# Patient Record
Sex: FEMALE | Race: WHITE | NOT HISPANIC OR LATINO | Employment: UNEMPLOYED | URBAN - NONMETROPOLITAN AREA
[De-identification: names, ages, dates, MRNs, and addresses within clinical notes are randomized per-mention and may not be internally consistent; named-entity substitution may affect disease eponyms.]

---

## 2022-02-04 ENCOUNTER — OFFICE VISIT (OUTPATIENT)
Dept: URGENT CARE | Facility: CLINIC | Age: 37
End: 2022-02-04
Payer: COMMERCIAL

## 2022-02-04 VITALS
WEIGHT: 182 LBS | OXYGEN SATURATION: 98 % | SYSTOLIC BLOOD PRESSURE: 111 MMHG | BODY MASS INDEX: 30.32 KG/M2 | DIASTOLIC BLOOD PRESSURE: 75 MMHG | TEMPERATURE: 98.1 F | HEIGHT: 65 IN | HEART RATE: 102 BPM

## 2022-02-04 DIAGNOSIS — L50.9 URTICARIA: Primary | ICD-10-CM

## 2022-02-04 PROCEDURE — 99202 OFFICE O/P NEW SF 15 MIN: CPT | Performed by: PHYSICIAN ASSISTANT

## 2022-02-04 RX ORDER — LAMOTRIGINE 100 MG/1
100 TABLET ORAL
COMMUNITY

## 2022-02-04 RX ORDER — LISINOPRIL 10 MG/1
10 TABLET ORAL DAILY
COMMUNITY
Start: 2021-09-24 | End: 2022-07-20

## 2022-02-04 RX ORDER — METFORMIN HYDROCHLORIDE 500 MG/1
2000 TABLET, EXTENDED RELEASE ORAL DAILY
COMMUNITY
Start: 2021-12-08 | End: 2022-07-20

## 2022-02-04 RX ORDER — HYDROXYZINE 50 MG/1
50 TABLET, FILM COATED ORAL 3 TIMES DAILY PRN
COMMUNITY
Start: 2021-11-23

## 2022-02-04 RX ORDER — METHYLPREDNISOLONE 4 MG/1
TABLET ORAL
Qty: 1 EACH | Refills: 0 | Status: SHIPPED | OUTPATIENT
Start: 2022-02-04

## 2022-02-04 RX ORDER — BUPROPION HYDROCHLORIDE 150 MG/1
150 TABLET ORAL DAILY
COMMUNITY
Start: 2021-11-23

## 2022-02-04 RX ORDER — BUPROPION HYDROCHLORIDE 300 MG/1
450 TABLET ORAL
COMMUNITY

## 2022-02-04 RX ORDER — DULAGLUTIDE 4.5 MG/.5ML
4.5 INJECTION, SOLUTION SUBCUTANEOUS WEEKLY
COMMUNITY
Start: 2021-12-08 | End: 2022-07-20 | Stop reason: ALTCHOICE

## 2022-02-04 RX ORDER — LEVOTHYROXINE SODIUM 0.05 MG/1
50 TABLET ORAL DAILY
COMMUNITY
Start: 2021-12-08 | End: 2022-12-08

## 2022-02-04 RX ORDER — FAMOTIDINE 20 MG/1
20 TABLET, FILM COATED ORAL 2 TIMES DAILY
Qty: 15 TABLET | Refills: 0 | Status: SHIPPED | OUTPATIENT
Start: 2022-02-04

## 2022-02-04 RX ORDER — AMMONIUM LACTATE 12 G/100G
CREAM TOPICAL
COMMUNITY
Start: 2021-09-13 | End: 2022-09-13

## 2022-02-04 RX ORDER — ATORVASTATIN CALCIUM 20 MG/1
20 TABLET, FILM COATED ORAL DAILY
COMMUNITY
Start: 2021-09-24 | End: 2022-09-24

## 2022-02-04 NOTE — PATIENT INSTRUCTIONS
Start Pepcid and take Atarax 3 times daily  If no improvement start Medrol dose pack  Urticaria   AMBULATORY CARE:   Urticaria  is also called hives  Hives can change size and shape, and appear anywhere on your skin  They can be mild or severe and last from a few minutes to a few days  Hives may be a sign of a severe allergic reaction called anaphylaxis that needs immediate treatment  Urticaria that lasts longer than 6 weeks may be a chronic condition that needs long-term treatment  Call your local emergency number (911 in the [address] [facility]) for signs or symptoms of anaphylaxis,  such as trouble breathing, swelling in your mouth or throat, or wheezing  You may also have itching, a rash, or feel like you are going to faint  Seek care immediately if:   · Your heart is beating faster than it normally does  · You have cramping or severe pain in your abdomen  Call your doctor if:   · You have a fever  · Your skin still itches 24 hours after you take your medicine  · You still have hives after 7 days  · Your joints are painful and swollen  · You have questions or concerns about your condition or care  Steps to take for signs or symptoms of anaphylaxis:   · Immediately  give 1 shot of epinephrine only into the outer thigh muscle  · Leave the shot in place  as directed  Your healthcare provider may recommend you leave it in place for up to 10 seconds before you remove it  This helps make sure all of the epinephrine is delivered  · Call 911 and go to the emergency department,  even if the shot improved symptoms  Do not drive yourself  Bring the used epinephrine shot with you  Treatment for mild urticaria  may not be needed  Chronic urticaria may need to be treated with more than one medicine, or other medicines than listed below  The following are common medicines used to treat urticaria:  · Antihistamines  decrease mild symptoms such as itching or a rash      · Steroids  decrease redness, pain, and swelling  · Epinephrine  is used to treat severe allergic reactions such as anaphylaxis  Safety precautions to take if you are at risk for anaphylaxis:   · Keep 2 shots of epinephrine with you at all times  You may need a second shot, because epinephrine only works for about 20 minutes and symptoms may return  Your healthcare provider can show you and family members how to give the shot  Check the expiration date every month and replace it before it expires  · Create an action plan  Your healthcare provider can help you create a written plan that explains the allergy and an emergency plan to treat a reaction  The plan explains when to give a second epinephrine shot if symptoms return or do not improve after the first  Give copies of the action plan and emergency instructions to family members, work and school staff, and  providers  Show them how to give a shot of epinephrine  · Be careful when you exercise  If you have had exercise-induced anaphylaxis, do not exercise right after you eat  Stop exercising right away if you start to develop any signs or symptoms of anaphylaxis  You may first feel tired, warm, or have itchy skin  Hives, swelling, and severe breathing problems may develop if you continue to exercise  · Carry medical alert identification  Wear medical alert jewelry or carry a card that explains the allergy  Ask your healthcare provider where to get these items  · Keep a record of triggers and symptoms  Record everything you eat, drink, or apply to your skin for 3 weeks  Include stressful events and what you were doing right before your hives started  Bring the record with you to follow-up visits with your healthcare provider  Manage urticaria:   · Cool your skin  This may help decrease itching  Apply a cool pack to your hives  Dip a hand towel in cool water, wring it out, and place it on your hives   You may also soak your skin in a cool oatmeal bath     · Do not rub your hives  This can irritate your skin and cause more hives  · Wear loose clothing  Tight clothes may irritate your skin and cause more hives  · Manage stress  Stress may trigger hives, or make them worse  Learn new ways to relax, such as deep breathing  Follow up with your doctor as directed:  Write down your questions so you remember to ask them during your visits  © Copyright [employer] 2021 Information is for End User's use only and may not be sold, redistributed or otherwise used for commercial purposes  All illustrations and images included in CareNotes® are the copyrighted property of A D A M , Inc  or [facility] Hernesto Javed   The above information is an  only  It is not intended as medical advice for individual conditions or treatments  Talk to your doctor, nurse or pharmacist before following any medical regimen to see if it is safe and effective for you

## 2022-02-04 NOTE — PROGRESS NOTES
[facility]        NAME: Daniel Hannon is a 39 y o  female  : 1985    MRN: [id number]  DATE: 2022  TIME: 2:18 PM    Assessment and Plan   Urticaria [L50 9]  1  Urticaria  methylPREDNISolone 4 MG tablet therapy pack    famotidine (PEPCID) 20 mg tablet         Patient Instructions     Start Pepcid and take Atarax 3 times daily  If no improvement start Medrol dose pack  Follow up with PCP in 3-5 days  Proceed to  ER if symptoms worsen  Chief Complaint     Chief Complaint   Patient presents with    Urticaria     bilateral hands knees elbows         History of Present Illness       Patient presents with hives on upper and lower extremities which started this morning  No changes in lotions, soaps or detergents  Review of Systems   Review of Systems   Constitutional: Negative for chills and fever  Respiratory: Negative for shortness of breath  Skin: Positive for rash           Current Medications       Current Outpatient Medications:     ammonium lactate (LAC-HYDRIN) 12 % cream, Apply topically, Disp: , Rfl:     atorvastatin (LIPITOR) 20 mg tablet, Take 20 mg by mouth daily, Disp: , Rfl:     buPROPion (WELLBUTRIN XL) 150 mg 24 hr tablet, Take 150 mg by mouth daily, Disp: , Rfl:     Dulaglutide (Trulicity) 4 5 FJ/2 0NJ SOPN, Inject 4 5 mg under the skin Once a week, Disp: , Rfl:     hydrOXYzine HCL (ATARAX) 50 mg tablet, Take 50 mg by mouth Three times daily as needed, Disp: , Rfl:     levothyroxine 50 mcg tablet, Take 50 mcg by mouth daily, Disp: , Rfl:     lisinopril (ZESTRIL) 10 mg tablet, Take 10 mg by mouth daily, Disp: , Rfl:     metFORMIN (GLUCOPHAGE-XR) 500 mg 24 hr tablet, Take 2,000 mg by mouth daily, Disp: , Rfl:     buPROPion (WELLBUTRIN XL) 300 mg 24 hr tablet, Take 450 mg by mouth, Disp: , Rfl:     famotidine (PEPCID) 20 mg tablet, Take 1 tablet (20 mg total) by mouth 2 (two) times a day, Disp: 15 tablet, Rfl: 0    lamoTRIgine (LaMICtal) 100 mg tablet, Take 100 mg by mouth, Disp: , Rfl:     methylPREDNISolone 4 MG tablet therapy pack, Use as directed on package, Disp: 1 each, Rfl: 0    Current Allergies     Allergies as of 2022 - Reviewed 2022   Allergen Reaction Noted    Sulfacetamide Rash 2022    Ibuprofen Nausea Only 2022            The following portions of the patient's history were reviewed and updated as appropriate: allergies, current medications, past family history, past medical history, past social history, past surgical history and problem list      Past Medical History:   Diagnosis Date    Anxiety     [facility]ipolar 1 disorder ([facility] )     Depression     Diabetes mellitus ([facility] )     Hypertension     Ovarian cancer ([place] [address] )        Past Surgical History:   Procedure Laterality Date    BREAST RECONSTRUCTION  2022     SECTION      CHOLECYSTECTOMY      HYSTERECTOMY         Family History   Problem Relation Age of Onset    Cancer Mother     Mental illness Mother     Hyperlipidemia Father          Medications have been verified  Objective   /75   Pulse 102   Temp 98 1 °F (36 7 °C)   Ht 5' 5" (1 651 m)   Wt 82 6 kg (182 lb)   SpO2 98%   BMI 30 29 kg/m²   No LMP recorded  Physical Exam     Physical Exam  Vitals and nursing note reviewed  Constitutional:       Appearance: Normal appearance  HENT:      Head: Normocephalic and atraumatic  Cardiovascular:      Rate and Rhythm: Normal rate and regular rhythm  Pulmonary:      Effort: Pulmonary effort is normal    Skin:     General: Skin is warm  Comments: Raised blotchy rash of B/L elbows, knees and forearms consistent with urticaria  Neurological:      Mental Status: She is alert

## 2022-07-20 ENCOUNTER — OFFICE VISIT (OUTPATIENT)
Dept: URGENT CARE | Facility: CLINIC | Age: 37
End: 2022-07-20
Payer: COMMERCIAL

## 2022-07-20 VITALS
HEART RATE: 95 BPM | HEIGHT: 65 IN | DIASTOLIC BLOOD PRESSURE: 84 MMHG | OXYGEN SATURATION: 96 % | RESPIRATION RATE: 18 BRPM | WEIGHT: 197 LBS | TEMPERATURE: 98.3 F | SYSTOLIC BLOOD PRESSURE: 123 MMHG | BODY MASS INDEX: 32.82 KG/M2

## 2022-07-20 DIAGNOSIS — T23.211A: Primary | ICD-10-CM

## 2022-07-20 PROCEDURE — 90715 TDAP VACCINE 7 YRS/> IM: CPT

## 2022-07-20 PROCEDURE — 16020 DRESS/DEBRID P-THICK BURN S: CPT

## 2022-07-20 PROCEDURE — 99213 OFFICE O/P EST LOW 20 MIN: CPT

## 2022-07-20 RX ORDER — ESTROGENS, CONJUGATED 1.25 MG
1.25 TABLET ORAL DAILY
COMMUNITY
Start: 2022-07-03

## 2022-07-20 RX ORDER — DAPAGLIFLOZIN 10 MG/1
10 TABLET, FILM COATED ORAL DAILY
COMMUNITY
Start: 2022-05-23

## 2022-07-20 RX ORDER — EPINEPHRINE 0.3 MG/.3ML
INJECTION SUBCUTANEOUS
COMMUNITY
Start: 2022-02-07

## 2022-07-20 RX ORDER — FLUOXETINE HYDROCHLORIDE 20 MG/1
CAPSULE ORAL
COMMUNITY
Start: 2022-07-16

## 2022-07-20 RX ORDER — LIRAGLUTIDE 6 MG/ML
INJECTION SUBCUTANEOUS
COMMUNITY
Start: 2022-07-20

## 2022-07-20 RX ORDER — BUSPIRONE HYDROCHLORIDE 30 MG/1
30 TABLET ORAL 2 TIMES DAILY
COMMUNITY
Start: 2022-07-03

## 2022-07-20 RX ORDER — GINSENG 100 MG
1 CAPSULE ORAL ONCE
Status: COMPLETED | OUTPATIENT
Start: 2022-07-20 | End: 2022-07-20

## 2022-07-20 RX ADMIN — Medication 1 SMALL APPLICATION: at 15:26

## 2022-07-20 NOTE — PATIENT INSTRUCTIONS
Keep wound clean and dry  Wash with mild temperature water and soap  Apply bacitracin to the area  Avoiding opening the wound

## 2022-07-20 NOTE — PROGRESS NOTES
[facility]        NAME: Beto Rubio is a 39 y o  female  : 1985    MRN: [id number]  DATE: 2022  TIME: 3:51 PM    Assessment and Plan   Second degree burn of right thumb, initial encounter [T23 211A]  1  Second degree burn of right thumb, initial encounter  bacitracin topical ointment 1 small application    Tdap Vaccine greater than or equal to 6yo    Burn Treatment     Burn was cleansed in office  Given patient allergy to SSD did apply bacitracin to the area  Wound was dressed with sterile gauze  Tetanus shot was updated  Patient Instructions   Keep wound clean and dry  Wash with mild temperature water and soap  Apply bacitracin to the area  Avoiding opening the wound  Follow up with PCP in 3-5 days  Proceed to  ER if symptoms worsen  Chief Complaint     Chief Complaint   Patient presents with    Burn         History of Present Illness       Patient is a 39year old female who presents to the office today for a burn to her right thumb  States she was pulling french fries out of the oven and got burnt from the pan  Review of Systems   Review of Systems   Skin: Positive for wound  All other systems reviewed and are negative          Current Medications       Current Outpatient Medications:     ammonium lactate (LAC-HYDRIN) 12 % cream, Apply topically, Disp: , Rfl:     atorvastatin (LIPITOR) 20 mg tablet, Take 20 mg by mouth daily, Disp: , Rfl:     buPROPion (WELLBUTRIN XL) 150 mg 24 hr tablet, Take 150 mg by mouth daily, Disp: , Rfl:     buPROPion (WELLBUTRIN XL) 300 mg 24 hr tablet, Take 450 mg by mouth, Disp: , Rfl:     EPINEPHrine (EPIPEN) 0 3 mg/0 3 mL SOAJ, INJECT 0 3 MLS (0 3 MG TOTAL) INTO THE MUSCLE ONE TIME FOR 1 DOSE, Disp: , Rfl:     Farxiga 10 MG TABS, Take 10 mg by mouth daily, Disp: , Rfl:     hydrOXYzine HCL (ATARAX) 50 mg tablet, Take 50 mg by mouth Three times daily as needed, Disp: , Rfl:     lamoTRIgine (LaMICtal) 100 mg tablet, Take 100 mg by mouth, Disp: , Rfl:     levothyroxine 50 mcg tablet, Take 50 mcg by mouth daily, Disp: , Rfl:     metFORMIN (GLUCOPHAGE-XR) 500 mg 24 hr tablet, Take 2,000 mg by mouth daily, Disp: , Rfl:     Victoza injection, , Disp: , Rfl:     Ascorbic Acid, Vitamin C, (VITAMIN C) 100 MG tablet, Take by mouth, Disp: , Rfl:     busPIRone (BUSPAR) 30 MG tablet, Take 30 mg by mouth 2 (two) times a day, Disp: , Rfl:     famotidine (PEPCID) 20 mg tablet, Take 1 tablet (20 mg total) by mouth 2 (two) times a day, Disp: 15 tablet, Rfl: 0    FLUoxetine (PROzac) 20 mg capsule, , Disp: , Rfl:     methylPREDNISolone 4 MG tablet therapy pack, Use as directed on package, Disp: 1 each, Rfl: 0    Premarin 1 25 MG tablet, Take 1 25 mg by mouth daily, Disp: , Rfl:   No current facility-administered medications for this visit  Current Allergies     Allergies as of 2022 - Reviewed 2022   Allergen Reaction Noted    Lisinopril Angioedema 2022    Sulfacetamide Rash 2022    Ibuprofen Nausea Only 2022            The following portions of the patient's history were reviewed and updated as appropriate: allergies, current medications, past family history, past medical history, past social history, past surgical history and problem list      Past Medical History:   Diagnosis Date    Anxiety     [facility]     Depression     [facility] mellitus [facility] )     Hypertension     [facility]ian cancer ([facility] )        Past Surgical History:   Procedure Laterality Date    BREAST RECONSTRUCTION  2022     SECTION      CHOLECYSTECTOMY      HYSTERECTOMY         Family History   Problem Relation Age of Onset    Cancer Mother     Mental illness Mother     Hyperlipidemia Father          Medications have been verified          Objective   /84   Pulse 95   Temp 98 3 °F (36 8 °C)   Resp 18   Ht 5' 5" (1 651 m)   Wt 89 4 kg (197 lb)   SpO2 96%   BMI 32 78 kg/m²          Physical Exam Physical Exam  Vitals and nursing note reviewed  Constitutional:       General: She is not in acute distress  Appearance: Normal appearance  She is normal weight  She is not ill-appearing or toxic-appearing  Cardiovascular:      Rate and Rhythm: Normal rate and regular rhythm  Pulses: Normal pulses  Heart sounds: Normal heart sounds  No murmur heard  No friction rub  No gallop  Pulmonary:      Effort: Pulmonary effort is normal       Breath sounds: Normal breath sounds  Musculoskeletal:        Hands:       Comments: Patient has full range of motion of the thumb and fingers  Sensation intact  Cap refill less than 2 seconds  Skin:     Capillary Refill: Capillary refill takes less than 2 seconds  Findings: Burn present  Neurological:      General: No focal deficit present  Mental Status: She is alert and oriented to person, place, and time  Universal Protocol:  Consent: Verbal consent obtained  Risks and benefits: risks, benefits and alternatives were discussed  Consent given by: patient  Time out: Immediately prior to procedure a "time out" was called to verify the correct patient, procedure, equipment, support staff and site/side marked as required  Burn Treatment    Date/Time: 7/20/2022 3:49 PM  Performed by: [employer]   Authorized by: [employer] [place]     Patient location:  Clinic  Burn area 1 details:     Burn depth:  Partial thickness (2nd)    Burn extent (%):  1    Affected area:  Upper extremity    Upper extremity location:  R hand    Burn notes:  Second degree burn to right thumb    Debridement performed: no      Wound treatment:  Bacitracin and other (comment) (antimicrobial soap and tepid water)    Dressing:  Non-stick sterile dressing  Post-procedure details:     Patient tolerance of procedure:   Tolerated well, no immediate complications

## 2022-09-16 ENCOUNTER — DOCTOR'S OFFICE (OUTPATIENT)
Dept: URBAN - NONMETROPOLITAN AREA CLINIC 1 | Facility: CLINIC | Age: 37
Setting detail: OPHTHALMOLOGY
End: 2022-09-16
Payer: COMMERCIAL

## 2022-09-16 DIAGNOSIS — H43.393: ICD-10-CM

## 2022-09-16 DIAGNOSIS — E11.9: ICD-10-CM

## 2022-09-16 DIAGNOSIS — H02.402: ICD-10-CM

## 2022-09-16 PROCEDURE — 92201 OPSCPY EXTND RTA DRAW UNI/BI: CPT | Performed by: OPHTHALMOLOGY

## 2022-09-16 PROCEDURE — 92134 CPTRZ OPH DX IMG PST SGM RTA: CPT | Performed by: OPHTHALMOLOGY

## 2022-09-16 PROCEDURE — 99214 OFFICE O/P EST MOD 30 MIN: CPT | Performed by: OPHTHALMOLOGY

## 2022-09-16 ASSESSMENT — VISUAL ACUITY
OS_BCVA: 20/30
OD_BCVA: 20/50-2

## 2022-09-16 ASSESSMENT — LID POSITION - PTOSIS
OD_PTOSIS: 1+
OS_PTOSIS: LUL

## 2022-09-16 ASSESSMENT — CONFRONTATIONAL VISUAL FIELD TEST (CVF)
OD_FINDINGS: FULL
OS_FINDINGS: FULL

## 2022-10-12 ENCOUNTER — DOCTOR'S OFFICE (OUTPATIENT)
Dept: URBAN - NONMETROPOLITAN AREA CLINIC 1 | Facility: CLINIC | Age: 37
Setting detail: OPHTHALMOLOGY
End: 2022-10-12
Payer: COMMERCIAL

## 2022-10-12 ENCOUNTER — EVALUATION (OUTPATIENT)
Dept: PHYSICAL THERAPY | Facility: CLINIC | Age: 37
End: 2022-10-12
Payer: COMMERCIAL

## 2022-10-12 DIAGNOSIS — H53.40: ICD-10-CM

## 2022-10-12 DIAGNOSIS — M70.61 TROCHANTERIC BURSITIS OF RIGHT HIP: Primary | ICD-10-CM

## 2022-10-12 DIAGNOSIS — H50.52: ICD-10-CM

## 2022-10-12 DIAGNOSIS — H53.2: ICD-10-CM

## 2022-10-12 DIAGNOSIS — Q07.8: ICD-10-CM

## 2022-10-12 PROCEDURE — 97535 SELF CARE MNGMENT TRAINING: CPT

## 2022-10-12 PROCEDURE — 97162 PT EVAL MOD COMPLEX 30 MIN: CPT

## 2022-10-12 PROCEDURE — 92060 SENSORIMOTOR EXAMINATION: CPT | Performed by: OPHTHALMOLOGY

## 2022-10-12 PROCEDURE — 99213 OFFICE O/P EST LOW 20 MIN: CPT | Performed by: OPHTHALMOLOGY

## 2022-10-12 PROCEDURE — 92083 EXTENDED VISUAL FIELD XM: CPT | Performed by: OPHTHALMOLOGY

## 2022-10-12 ASSESSMENT — REFRACTION_CURRENTRX
OS_SPHERE: -2.75
OD_OVR_VA: 20/
OD_VPRISM_DIRECTION: SV
OD_CYLINDER: -0.75
OS_OVR_VA: 20/
OS_AXIS: 137
OD_AXIS: 11
OD_SPHERE: -1.00
OS_CYLINDER: -1.00
OS_VPRISM_DIRECTION: SV

## 2022-10-12 ASSESSMENT — VISUAL ACUITY
OS_BCVA: 20/25-2
OD_BCVA: 20/50-2

## 2022-10-12 ASSESSMENT — CONFRONTATIONAL VISUAL FIELD TEST (CVF)
OS_FINDINGS: FULL
OD_FINDINGS: FULL

## 2022-10-12 ASSESSMENT — LID POSITION - PTOSIS: OS_PTOSIS: LUL

## 2022-10-12 NOTE — PROGRESS NOTES
PT Evaluation     Today's date: 10/12/2022  Patient name: Manjit Maldonado  : 1985  MRN: [id number]  Referring provider: Zackary Painting MD  Dx:   Encounter Diagnosis     ICD-10-CM    1  Trochanteric bursitis of right hip  M70 61        Start Time: 0900  Stop Time: 09  Total time in clinic (min): 47 minutes    Assessment  Assessment details: Patient is a 44 y o female who presents to OP PT with R hip pain and signs and symptoms consistent with hip bursitis  Upon examination patient presents with key impairments limited ROM, LE strength, muscular endurance, and dynamic balance  Due to this patient is restricted with negotiating stairs, standing/walking for long periods of time, and sleeping on right side  Patient is limited in participating in ADLs/IADLs, work and caring for self/children  Patient will benefit from skilled PT and POC will include stretching, strengthening, analgesic modalities, and manual therapy in order to maximize functional independence  Impairments: abnormal gait, abnormal muscle tone, abnormal or restricted ROM, activity intolerance, impaired physical strength, lacks appropriate home exercise program and pain with function  Understanding of Dx/Px/POC: good   Prognosis: good    Goals  STG: (in 4 weeks)  Patient will initiate HEP  Patient will improve hip ROM in all planes  Patient will decrease pain from 9/10 to 4/10 in R hip  Patient will improve sitting/standing tolerance >15 min in order to be more independent with ADLs  Patient will improve sleeping tolerance to be able to sleep throughout the night in order to improve QOL  LTG: (in 8 weeks)  Patient will decrease pain 9/10 to 1/10 in R hip  Patient will improve strength by 1-2 MMT in R hip musculature  Patient will improve FOTO score from initial evaluation to discharge score  Patient will improve walking tolerance >30 min       Plan  Patient would benefit from: skilled physical therapy  Planned modality interventions: cryotherapy, thermotherapy: hydrocollator packs and TENS  Planned therapy interventions: balance, flexibility, home exercise program, functional ROM exercises, joint mobilization, manual therapy, patient education, self care, strengthening, stretching, therapeutic exercise and therapeutic activities  Frequency: 2x week  Duration in weeks: 8  Treatment plan discussed with: patient        Subjective Evaluation    History of Present Illness  Date of onset: 2022  Mechanism of injury: Patient indicated that her right hip has been causing her pain for > 6 months  This pain is located on posterior-lateral portion of R Hip that radiates into R groin and low back  She was administered steroid injection and anti-inflammatory cream with no relief  Patient experiences difficulty with steps, standing/walking for long periods of time, and sleeping on R hip  Quality of life: good    Pain  Current pain ratin  At best pain ratin  At worst pain ratin  Location: R posterior-lateral, groin and low back  Quality: dull ache and radiating  Relieving factors: heat  Aggravating factors: running, walking, sitting and standing    Social Support  Stairs in house: yes (14 steps)   Lives in: multiple-level home    Working: work from home free [employer]ilo  Treatments  Previous treatment: injection treatment (anti-inflammatory cream)  Current treatment: physical therapy  Patient Goals  Patient goals for therapy: decreased pain, increased motion, increased strength and independence with ADLs/IADLs  Patient goal: Patient would like to get back to daily walking, taking after kids, and ADLS  Objective     Tenderness     Left Hip   No tenderness in the PSIS and greater trochanter  Right Hip   Tenderness in the PSIS and greater trochanter  Additional Tenderness Details  TTP on R greater trochanter       Active Range of Motion   Left Hip   Flexion: WFL    Right Hip   Flexion: 95 degrees with pain  Extension: 10 degrees   Abduction: 30 degrees   External rotation (90/90): 35 degrees with pain  Internal rotation (90/90): 25 degrees with pain  Left Knee   Normal active range of motion    Right Knee   Normal active range of motion  Left Ankle/Foot   Normal active range of motion    Right Ankle/Foot   Normal active range of motion    Strength/Myotome Testing     Left Hip   Planes of Motion   Flexion: 4+  Extension: 4+  Abduction: 4+  Adduction: 4+  External rotation: 4  Internal rotation: 4    Right Hip   Planes of Motion   Flexion: 3+ (Pain in right posterior-lateral and groin)  Extension: 4  Abduction: 3+ (Pain in right posterior-lateral and groin)  External rotation: 3+ (Pain in right posterior-lateral and groin)  Internal rotation: 3+ (Pain in right posterior-lateral and groin)    Left Knee   Flexion: 4+  Extension: 4+    Right Knee   Flexion: 4  Prone flexion: 4    Left Ankle/Foot   Dorsiflexion: 4+  Plantar flexion: 4+  Inversion: 4+  Eversion: 4+    Right Ankle/Foot   Dorsiflexion: 4  Plantar flexion: 3+  Inversion: 4  Eversion: 4    Tests     Left Hip   Negative NINA, FADIR, Mandi, scour, SI compression and SI distraction  Right Hip   Positive NINA and Mandi  Negative FADIR, scour, SI compression and SI distraction                Precautions:       Manuals 10/12            LE stretching              STM                                        Neuro Re-Ed             SLS             Tandem on foam                                                                              Ther Ex             Nu-step             TR/HR             3-way hip SLR             1/4 squat             Step-ups fwd/lat             Standing ITB wall stretch             QS             LAQ             Bridges             Claimshells              Adduction Ball squeeze                                                     Ther Activity                                       Gait Training Modalities             CP             TENS

## 2022-10-12 NOTE — LETTER
2022    Magaly Stern MD  [address] [address]    Patient: Lena Rivero   YOB: 1985   Date of Visit: 10/12/2022     Encounter Diagnosis     ICD-10-CM    1  Trochanteric bursitis of right hip  M70 61        Dear Dr Kristy Friedman: Thank you for your recent referral of Lena Rivero  Please review the attached evaluation summary from [address] recent visit  Please verify that you agree with the plan of care by signing the attached order  If you have any questions or concerns, please do not hesitate to call  I sincerely appreciate the opportunity to share in the care of one of your patients and hope to have another opportunity to work with you in the near future  Sincerely,    Mak Menendez, PT      Referring Provider:      I certify that I have read the below Plan of Care and certify the need for these services furnished under this plan of treatment while under my care  Magaly Stern MD  [address]  Via Fax: [phone]          PT Evaluation     Today's date: 10/12/2022  Patient name: Lena Rivero  : 1985  MRN: [id number]  Referring provider: Reginald Sacks, MD  Dx:   Encounter Diagnosis     ICD-10-CM    1  Trochanteric bursitis of right hip  M70 61        Start Time: 0900  Stop Time: 09  Total time in clinic (min): 47 minutes    Assessment  Assessment details: Patient is a 44 y o female who presents to OP PT with R hip pain and signs and symptoms consistent with hip bursitis  Upon examination patient presents with key impairments limited ROM, LE strength, muscular endurance, and dynamic balance  Due to this patient is restricted with negotiating stairs, standing/walking for long periods of time, and sleeping on right side  Patient is limited in participating in ADLs/IADLs, work and caring for self/children   Patient will benefit from skilled PT and POC will include stretching, strengthening, analgesic modalities, and manual therapy in order to maximize functional independence  Impairments: abnormal gait, abnormal muscle tone, abnormal or restricted ROM, activity intolerance, impaired physical strength, lacks appropriate home exercise program and pain with function  Understanding of Dx/Px/POC: good   Prognosis: good    Goals  STG: (in 4 weeks)  Patient will initiate HEP  Patient will improve hip ROM in all planes  Patient will decrease pain from 9/10 to 4/10 in R hip  Patient will improve sitting/standing tolerance >15 min in order to be more independent with ADLs  Patient will improve sleeping tolerance to be able to sleep throughout the night in order to improve QOL  LTG: (in 8 weeks)  Patient will decrease pain 9/10 to 1/10 in R hip  Patient will improve strength by 1-2 MMT in R hip musculature  Patient will improve FOTO score from initial evaluation to discharge score  Patient will improve walking tolerance >30 min  Plan  Patient would benefit from: skilled physical therapy  Planned modality interventions: cryotherapy, thermotherapy: hydrocollator packs and TENS  Planned therapy interventions: balance, flexibility, home exercise program, functional ROM exercises, joint mobilization, manual therapy, patient education, self care, strengthening, stretching, therapeutic exercise and therapeutic activities  Frequency: 2x week  Duration in weeks: 8  Treatment plan discussed with: patient        Subjective Evaluation    History of Present Illness  Date of onset: 4/12/2022  Mechanism of injury: Patient indicated that her right hip has been causing her pain for > 6 months  This pain is located on posterior-lateral portion of R Hip that radiates into R groin and low back  She was administered steroid injection and anti-inflammatory cream with no relief  Patient experiences difficulty with steps, standing/walking for long periods of time, and sleeping on R hip  Quality of life: good    Pain  Current pain ratin  At best pain ratin  At worst pain ratin  Location: R posterior-lateral, groin and low back  Quality: dull ache and radiating  Relieving factors: heat  Aggravating factors: running, walking, sitting and standing    Social Support  Stairs in house: yes (14 steps)   Lives in: multiple-level home    Working: work from home free [employer]  Treatments  Previous treatment: injection treatment (anti-inflammatory cream)  Current treatment: physical therapy  Patient Goals  Patient goals for therapy: decreased pain, increased motion, increased strength and independence with ADLs/IADLs  Patient goal: Patient would like to get back to daily walking, taking after kids, and ADLS  Objective     Tenderness     Left Hip   No tenderness in the PSIS and greater trochanter  Right Hip   Tenderness in the PSIS and greater trochanter  Additional Tenderness Details  TTP on R greater trochanter       Active Range of Motion   Left Hip   Flexion: WFL    Right Hip   Flexion: 95 degrees with pain  Extension: 10 degrees   Abduction: 30 degrees   External rotation (90/90): 35 degrees with pain  Internal rotation (90/90): 25 degrees with pain  Left Knee   Normal active range of motion    Right Knee   Normal active range of motion  Left Ankle/Foot   Normal active range of motion    Right Ankle/Foot   Normal active range of motion    Strength/Myotome Testing     Left Hip   Planes of Motion   Flexion: 4+  Extension: 4+  Abduction: 4+  Adduction: 4+  External rotation: 4  Internal rotation: 4    Right Hip   Planes of Motion   Flexion: 3+ (Pain in right posterior-lateral and groin)  Extension: 4  Abduction: 3+ (Pain in right posterior-lateral and groin)  External rotation: 3+ (Pain in right posterior-lateral and groin)  Internal rotation: 3+ (Pain in right posterior-lateral and groin)    Left Knee   Flexion: 4+  Extension: 4+    Right Knee   Flexion: 4  Prone flexion: 4    Left Ankle/Foot   Dorsiflexion: 4+  Plantar flexion: 4+  Inversion: 4+  Eversion: 4+    Right Ankle/Foot   Dorsiflexion: 4  Plantar flexion: 3+  Inversion: 4  Eversion: 4    Tests     Left Hip   Negative NINA, FADIR, Mandi, scour, SI compression and SI distraction  Right Hip   Positive NINA and Mandi  Negative FADIR, scour, SI compression and SI distraction                Precautions:       Manuals 10/12            LE stretching              STM                                        Neuro Re-Ed             SLS             Tandem on foam                                                                              Ther Ex             Nu-step             TR/HR             3-way hip SLR             1/4 squat             Step-ups fwd/lat             Standing ITB wall stretch             QS             LAQ             Bridges             Claimshells              Adduction Ball squeeze                                                     Ther Activity                                       Gait Training                                       Modalities             CP             TENS

## 2022-10-14 ENCOUNTER — OFFICE VISIT (OUTPATIENT)
Dept: PHYSICAL THERAPY | Facility: CLINIC | Age: 37
End: 2022-10-14
Payer: COMMERCIAL

## 2022-10-14 DIAGNOSIS — M70.61 TROCHANTERIC BURSITIS OF RIGHT HIP: Primary | ICD-10-CM

## 2022-10-14 PROCEDURE — 97110 THERAPEUTIC EXERCISES: CPT | Performed by: PHYSICAL THERAPIST

## 2022-10-14 PROCEDURE — 97140 MANUAL THERAPY 1/> REGIONS: CPT | Performed by: PHYSICAL THERAPIST

## 2022-10-14 NOTE — PROGRESS NOTES
Daily Note     Today's date: 10/14/2022  Patient name: Daya Kraft  : 1985  MRN: [id number]  Referring provider: Tripp Dinh MD  Dx:   Encounter Diagnosis     ICD-10-CM    1  Trochanteric bursitis of right hip  M70 61                   Subjective: Patient states "I'm a little sore today"  Objective: See treatment diary below      Assessment: Patient with good tolerance to first treatment today  Minimal VC's required for correct performance of TE  Patient with increased tightness noted t/o both LE  Plan: Continue per plan of care        Precautions:       Manuals 10/12 10/14           LE stretching   15 min           STM                                        Neuro Re-Ed             SLS             Tandem on foam                                                                              Ther Ex             Nu-step  L3 6 min           TR/HR  2x10           3-way hip SLR  2x10 B/L, each           1/4 squat             Step-ups fwd/lat             Standing ITB wall stretch  5x10"            QS  2x10 5"           LAQ             Bridges  2x10           Claimshells              Adduction Ball squeeze   2x10 3"                                                  Ther Activity                                       Gait Training                                       Modalities             CP             TENS

## 2022-10-17 ENCOUNTER — OFFICE VISIT (OUTPATIENT)
Dept: PHYSICAL THERAPY | Facility: CLINIC | Age: 37
End: 2022-10-17
Payer: COMMERCIAL

## 2022-10-17 DIAGNOSIS — M70.61 TROCHANTERIC BURSITIS OF RIGHT HIP: Primary | ICD-10-CM

## 2022-10-17 PROCEDURE — 97140 MANUAL THERAPY 1/> REGIONS: CPT

## 2022-10-17 PROCEDURE — 97110 THERAPEUTIC EXERCISES: CPT

## 2022-10-17 NOTE — PROGRESS NOTES
Daily Note     Today's date: 10/17/2022  Patient name: Aroldo Herman  : 1985  MRN: [id number]  Referring provider: Devi Marcos MD  Dx:   Encounter Diagnosis     ICD-10-CM    1  Trochanteric bursitis of right hip  M70 61                   Subjective: Patient reports continued pain in right hip  Patient reports had injection however only lasted 3 days  Objective: See treatment diary below      Assessment: Tolerated treatment well  Patient exhibited good technique with therapeutic exercises  Patient had to decreased ROM with abduction exercises secondary to pain  Patient has limited ROM due to pain  Plan: Continue per plan of care        Precautions:       Manuals 10/12 10/14 10/17/22          LE stretching   15 min 15 min          STM                                        Neuro Re-Ed             SLS             Tandem on foam                                                                              Ther Ex             Nu-step  L3 6 min l3 10 min          TR/HR  2x10 2x10          3-way hip SLR  2x10 B/L, each 2x10 b/l each          1/4 squat   2x10          Step-ups fwd/lat             Standing ITB wall stretch  5x10"  5x15"          QS  2x10 5" 3x10 5"          LAQ             Bridges  2x10 3x10          Claimshells              Adduction Ball squeeze   2x10 3" 3x10 3"          Supine SLR              supine hip Abduction with TB   GTB 2x10                       Ther Activity                                       Gait Training                                       Modalities             CP             TENS

## 2022-10-19 ENCOUNTER — OFFICE VISIT (OUTPATIENT)
Dept: PHYSICAL THERAPY | Facility: CLINIC | Age: 37
End: 2022-10-19
Payer: COMMERCIAL

## 2022-10-19 DIAGNOSIS — M70.61 TROCHANTERIC BURSITIS OF RIGHT HIP: Primary | ICD-10-CM

## 2022-10-19 PROCEDURE — 97140 MANUAL THERAPY 1/> REGIONS: CPT

## 2022-10-19 PROCEDURE — 97110 THERAPEUTIC EXERCISES: CPT

## 2022-10-19 NOTE — PROGRESS NOTES
Daily Note     Today's date: 10/19/2022  Patient name: Wen Almonte  : 1985  MRN: [id number]  Referring provider: Nedra Vizcarra MD  Dx:   Encounter Diagnosis     ICD-10-CM    1  Trochanteric bursitis of right hip  M70 61                   Subjective: Patient reports right hip is improving, but is still sore  Objective: See treatment diary below      Assessment: Tolerated treatment well  Patient exhibited good technique with therapeutic exercises      Plan: Continue per plan of care        Precautions:       Manuals 10/12 10/14 10/17/22 10/19/22         LE stretching   15 min 15 min 15 min         STM                                        Neuro Re-Ed             SLS    3x15" each         Tandem on foam                                                                              Ther Ex             SRC  L3 6 min l3 10 min l2 10 min         TR/HR  2x10 2x10 2x10         3-way hip SLR  2x10 B/L, each 2x10 b/l each 2x10         1/4 squat   2x10 2x10         Step-ups fwd/lat             Standing ITB wall stretch  5x10"  5x15" 5x15"         QS  2x10 5" 3x10 5" 3x10 5"         LAQ             Bridges  2x10 3x10 2x10         Claimshells              Adduction Ball squeeze   2x10 3" 3x10 3" 3x10         Supine SLR              supine hip Abduction with TB   GTB 2x10 GTB 2x10         Hip hike    2x10         Ther Activity                                       Gait Training                                       Modalities             CP             TENS

## 2022-10-24 ENCOUNTER — OFFICE VISIT (OUTPATIENT)
Dept: PHYSICAL THERAPY | Facility: CLINIC | Age: 37
End: 2022-10-24
Payer: COMMERCIAL

## 2022-10-24 DIAGNOSIS — M70.61 TROCHANTERIC BURSITIS OF RIGHT HIP: Primary | ICD-10-CM

## 2022-10-24 PROCEDURE — 97110 THERAPEUTIC EXERCISES: CPT

## 2022-10-24 PROCEDURE — 97140 MANUAL THERAPY 1/> REGIONS: CPT

## 2022-10-24 PROCEDURE — 97112 NEUROMUSCULAR REEDUCATION: CPT

## 2022-10-24 NOTE — PROGRESS NOTES
Daily Note     Today's date: 10/24/2022  Patient name: Elizabeth Frazier  : 1985  MRN: [id number]  Referring provider: Cheko Rodriguez MD  Dx:   Encounter Diagnosis     ICD-10-CM    1  Trochanteric bursitis of right hip  M70 61                   Subjective: Patient reports R hip soreness is about 6/10 at beginning of session  She states the pain was really bad over the weekend, but she does not recall doing anything differently that may have caused this increased pain  Objective: See treatment diary below      Assessment: Tolerated treatment well without significant increase in R hip pain  Increased R hip weakness noted compared to L hip when performing SL clamshell addition today  Added foam roller STM to ITB and patient tolerated this fair with relief noted following  Patient would benefit from continued PT to increase R hip strength and mobility for improved function in ADLs  Plan: Continue per plan of care        Precautions:       Manuals 10/12 10/14 10/17/22 10/19/22 10/24        LE stretching   15 min 15 min 15 min 8'        STM      7' foam roll                                  Neuro Re-Ed             SLS    3x15" each 3x15" each        Tandem on foam                                                                              Ther Ex             [address] [facility]  L3 6 min l3 10 min l2 10 min L2 10 min        TR/HR  2x10 2x10 2x10 2x10        3-way hip SLR  2x10 B/L, each 2x10 b/l each 2x10 2x10 b/l each        1/4 squat   2x10 2x10 2x10        Step-ups fwd/lat             Standing ITB wall stretch  5x10"  5x15" 5x15" 5x15"        QS  2x10 5" 3x10 5" 3x10 5" 3x10 5"        LAQ             Bridges  2x10 3x10 2x10 3x10        Claimshells      GTB 15x ea        Adduction Ball squeeze   2x10 3" 3x10 3" 3x10 3x10 3"        Supine SLR              supine hip Abduction with TB   GTB 2x10 GTB 2x10 GTB 2x10        Hip hike    2x10 2x10        Ther Activity                                       Gait Training [facility] CARDIOLOGY PROGRESS NOTE 
      
 
2/2/2019 9:26 AM 
 
Admit Date: 1/31/2019 Subjective:  
 
Rate control overnight some lower heart rates appear to be junctional rhythm. Now in sinus rate controlled Review of Systems Unable to perform ROS: Mental acuity Objective:  
  
Vitals:  
 02/02/19 6580 02/02/19 0448 02/02/19 0283 02/02/19 0830 BP: 121/61   103/68 Pulse: 71   85 Resp: 20   22 Temp: 98.4 °F (36.9 °C)   97.4 °F (36.3 °C) SpO2: 90%  91% 94% Weight:  215 lb (97.5 kg) Height:      
 
 
 
Physical Exam  
Constitutional: She appears ill. Eyes: Left eye exhibits no discharge. Neck: Normal range of motion. Cardiovascular: An irregularly irregular rhythm present. Pulmonary/Chest: She has wheezes. Abdominal: She exhibits no distension. Musculoskeletal:  
Trace edeam   
Neurological:  
Paraplegia Confused Skin: Skin is warm. Data Review:  
Recent Labs 02/02/19 
0615 02/01/19 
0404  01/31/19 
1056  141   < > 135* K 3.6 3.5   < > 6.5* MG  --   --   --  2.3 BUN 6* 9   < > 11  
CREA 1.28* 0.67   < > 0.78 * 222*   < > 266* WBC 10.3 14.0*  --  16.1* HGB 12.6 12.7  --  16.4* HCT 39.7 40.7  --  50.7*  318  --  460* INR  --   --   --  1.0  
 < > = values in this interval not displayed. Intake/Output Summary (Last 24 hours) at 2/2/2019 1612 Last data filed at 2/2/2019 0830 Gross per 24 hour Intake 0 ml Output 2280 ml Net -2280 ml  
 
Current Facility-Administered Medications Medication Dose Route Frequency  insulin glargine (LANTUS) injection 40 Units  40 Units SubCUTAneous QHS  insulin lispro (HUMALOG) injection 5 Units  5 Units SubCUTAneous TID WITH MEALS  metoprolol tartrate (LOPRESSOR) tablet 50 mg  50 mg Oral Q6H  
 apixaban (ELIQUIS) tablet 5 mg  5 mg Oral BID  sodium chloride (NS) flush 30 mL  30 mL InterCATHeter Q8H  
 Modalities             CP             TENS  heparin (porcine) pf 900 Units  900 Units InterCATHeter Q8H  
 sodium chloride (NS) flush 30 mL  30 mL InterCATHeter PRN  
 heparin (porcine) pf 900 Units  900 Units InterCATHeter PRN  
 ALPRAZolam (XANAX) tablet 0.5 mg  0.5 mg Oral BID PRN  
 bisacodyl (DULCOLAX) tablet 5 mg  5 mg Oral DAILY PRN  
 budesonide (PULMICORT) 500 mcg/2 ml nebulizer suspension  500 mcg Nebulization BID  citalopram (CELEXA) tablet 20 mg  20 mg Oral DAILY  oxyCODONE IR (ROXICODONE) tablet 10 mg  10 mg Oral Q8H PRN  
 QUEtiapine (SEROquel) tablet 300 mg  300 mg Oral QHS  ondansetron (ZOFRAN) injection 4 mg  4 mg IntraVENous Q8H PRN  
 acetaminophen (TYLENOL) tablet 650 mg  650 mg Oral Q6H PRN  piperacillin-tazobactam (ZOSYN) 3.375 g in 0.9% sodium chloride (MBP/ADV) 100 mL  3.375 g IntraVENous Q8H  
 albuterol-ipratropium (DUO-NEB) 2.5 MG-0.5 MG/3 ML  3 mL Nebulization Q6H PRN  
 haloperidol lactate (HALDOL) injection 2 mg  2 mg IntraVENous Q6H PRN  
 vancomycin (VANCOCIN) 1500 mg in  ml infusion  1,500 mg IntraVENous Q12H  
 insulin lispro (HUMALOG) injection   SubCUTAneous AC&HS  dilTIAZem (CARDIZEM) 125 mg in dextrose 5% 125 mL infusion  0-15 mg/hr IntraVENous TITRATE Assessment/Plan: 1. Atrial fibrillation now rate controlled on metoprolol 50 every 6 plan to consolidate as condition improves continue anticoagulation. Patient will need echocardiogram at some point. Wean dilt drip 2. Sepsis on antibiotics per primary team. 
3. Chronic hepatitis C per primary team 
4.  Type 2 diabetes per primary team. 
 
 
Prince David MD 
2/2/2019 9:26 AM

## 2022-10-26 ENCOUNTER — OFFICE VISIT (OUTPATIENT)
Dept: PHYSICAL THERAPY | Facility: CLINIC | Age: 37
End: 2022-10-26
Payer: COMMERCIAL

## 2022-10-26 DIAGNOSIS — M70.61 TROCHANTERIC BURSITIS OF RIGHT HIP: Primary | ICD-10-CM

## 2022-10-26 PROCEDURE — 97110 THERAPEUTIC EXERCISES: CPT | Performed by: PHYSICAL THERAPIST

## 2022-10-26 PROCEDURE — 97112 NEUROMUSCULAR REEDUCATION: CPT | Performed by: PHYSICAL THERAPIST

## 2022-10-26 PROCEDURE — 97140 MANUAL THERAPY 1/> REGIONS: CPT | Performed by: PHYSICAL THERAPIST

## 2022-10-26 NOTE — PROGRESS NOTES
Daily Note     Today's date: 10/26/2022  Patient name: Jong Caballero  : 1985  MRN: [id number]  Referring provider: Kayden Coronado MD  Dx:   Encounter Diagnosis     ICD-10-CM    1  Trochanteric bursitis of right hip  M70 61                   Subjective: Patient reports bruising from roller last time  Objective: See treatment diary below      Assessment: Tolerated treatment well  Patient exhibited good technique with therapeutic exercises  PT held roller today  Will continue to monitor  Plan: Continue per plan of care        Precautions:       Manuals 10/12 10/14 10/17/22 10/19/22 10/24 10/26       LE stretching   15 min 15 min 15 min 8' 15 min       STM      7' foam roll                                  Neuro Re-Ed             SLS    3x15" each 3x15" each 3x15" each       Tandem on foam                                                                              Ther Ex             [address] [facility]  L3 6 min l3 10 min l2 10 min L2 10 min L2 10 min       TR/HR  2x10 2x10 2x10 2x10 2x10       3-way hip SLR  2x10 B/L, each 2x10 b/l each 2x10 2x10 b/l each 2x10 B/L, each       1/4 squat   2x10 2x10 2x10 2x10       Step-ups fwd/lat             Standing ITB wall stretch  5x10"  5x15" 5x15" 5x15" 5x15"       QS  2x10 5" 3x10 5" 3x10 5" 3x10 5" 3x10 5"       LAQ             Bridges  2x10 3x10 2x10 3x10 3x10       Claimshells      GTB 15x ea GTB 2x10 each       Adduction Ball squeeze   2x10 3" 3x10 3" 3x10 3x10 3" 3x10 3"       Supine SLR              supine hip Abduction with TB   GTB 2x10 GTB 2x10 GTB 2x10 GTB 2x10       Hip hike    2x10 2x10 2x10       Ther Activity                                       Gait Training                                       Modalities             CP             TENS

## 2022-10-31 ENCOUNTER — APPOINTMENT (OUTPATIENT)
Dept: PHYSICAL THERAPY | Facility: CLINIC | Age: 37
End: 2022-10-31

## 2022-11-02 ENCOUNTER — OFFICE VISIT (OUTPATIENT)
Dept: PHYSICAL THERAPY | Facility: CLINIC | Age: 37
End: 2022-11-02

## 2022-11-02 DIAGNOSIS — M70.61 TROCHANTERIC BURSITIS OF RIGHT HIP: Primary | ICD-10-CM

## 2022-11-02 NOTE — PROGRESS NOTES
Daily Note     Today's date: 2022  Patient name: Eneida Kuhn  : 1985  MRN: [id number]  Referring provider: Winsome Sullivan MD  Dx:   Encounter Diagnosis     ICD-10-CM    1  Trochanteric bursitis of right hip  M70 61                   Subjective:     "Im ok"        Objective: See treatment diary below      Assessment: Tolerated treatment well  Patient exhibited good technique with therapeutic exercises      Plan: Continue per plan of care        Precautions:       Manuals 10/12 10/14 10/17/22 10/19/22 10/24 10/26 11/2      LE stretching   15 min 15 min 15 min 8' 15 min 15      STM      7' foam roll                                  Neuro Re-Ed             SLS    3x15" each 3x15" each 3x15" each 3x15"      Tandem on foam                                                                              Ther Ex             [address] [facility]  L3 6 min l3 10 min l2 10 min L2 10 min L2 10 min L2  10      TR/HR  2x10 2x10 2x10 2x10 2x10 20      3-way hip SLR  2x10 B/L, each 2x10 b/l each 2x10 2x10 b/l each 2x10 B/L, each 2x10      1/4 squat   2x10 2x10 2x10 2x10 2x10      Step-ups fwd/lat             Standing ITB wall stretch  5x10"  5x15" 5x15" 5x15" 5x15" 5x15"      QS  2x10 5" 3x10 5" 3x10 5" 3x10 5" 3x10 5" 3x10  5"       LAQ             Bridges  2x10 3x10 2x10 3x10 3x10 3x10      Claimshells      GTB 15x ea GTB 2x10 each GTB  230      Adduction Ball squeeze   2x10 3" 3x10 3" 3x10 3x10 3" 3x10 3" 3x10  3"        Supine SLR              supine hip Abduction with TB   GTB 2x10 GTB 2x10 GTB 2x10 GTB 2x10 GTB  2x10      Hip hike    2x10 2x10 2x10 2x10      Ther Activity                                       Gait Training                                       Modalities             CP             TENS

## 2022-11-09 ENCOUNTER — APPOINTMENT (OUTPATIENT)
Dept: PHYSICAL THERAPY | Facility: CLINIC | Age: 37
End: 2022-11-09

## 2022-11-14 ENCOUNTER — APPOINTMENT (OUTPATIENT)
Dept: PHYSICAL THERAPY | Facility: CLINIC | Age: 37
End: 2022-11-14

## 2022-11-16 ENCOUNTER — APPOINTMENT (OUTPATIENT)
Dept: PHYSICAL THERAPY | Facility: CLINIC | Age: 37
End: 2022-11-16

## 2022-11-21 ENCOUNTER — APPOINTMENT (OUTPATIENT)
Dept: PHYSICAL THERAPY | Facility: CLINIC | Age: 37
End: 2022-11-21

## 2022-11-23 ENCOUNTER — APPOINTMENT (OUTPATIENT)
Dept: PHYSICAL THERAPY | Facility: CLINIC | Age: 37
End: 2022-11-23

## 2022-11-28 ENCOUNTER — APPOINTMENT (OUTPATIENT)
Dept: PHYSICAL THERAPY | Facility: CLINIC | Age: 37
End: 2022-11-28

## 2022-11-30 ENCOUNTER — APPOINTMENT (OUTPATIENT)
Dept: PHYSICAL THERAPY | Facility: CLINIC | Age: 37
End: 2022-11-30

## 2023-05-02 ENCOUNTER — DOCTOR'S OFFICE (OUTPATIENT)
Dept: URBAN - NONMETROPOLITAN AREA CLINIC 1 | Facility: CLINIC | Age: 38
Setting detail: OPHTHALMOLOGY
End: 2023-05-02
Payer: COMMERCIAL

## 2023-05-02 DIAGNOSIS — H52.223: ICD-10-CM

## 2023-05-02 DIAGNOSIS — H52.13: ICD-10-CM

## 2023-05-02 PROCEDURE — 92015 DETERMINE REFRACTIVE STATE: CPT

## 2023-05-02 PROCEDURE — 92012 INTRM OPH EXAM EST PATIENT: CPT

## 2023-05-02 ASSESSMENT — REFRACTION_CURRENTRX
OD_VPRISM_DIRECTION: SV
OS_OVR_VA: 20/
OD_AXIS: 016
OD_CYLINDER: -0.75
OD_SPHERE: -1.00
OS_AXIS: 132
OD_OVR_VA: 20/
OS_CYLINDER: -1.00
OS_VPRISM_DIRECTION: SV
OS_SPHERE: -2.75

## 2023-05-02 ASSESSMENT — REFRACTION_MANIFEST
OD_CYLINDER: -1.00
OS_AXIS: 150
OD_SPHERE: -1.25
OS_SPHERE: -3.50
OS_CYLINDER: -1.00
OD_AXIS: 180

## 2023-05-02 ASSESSMENT — LID EXAM ASSESSMENTS
OD_MEIBOMITIS: RLL RUL 4+
OS_MEIBOMITIS: LLL LUL 4+
OD_BLEPHARITIS: 2+
OS_BLEPHARITIS: 2+

## 2023-05-02 ASSESSMENT — VISUAL ACUITY
OS_BCVA: 20/25-1
OD_BCVA: 20/50-2

## 2023-05-02 ASSESSMENT — SUPERFICIAL PUNCTATE KERATITIS (SPK)
OS_SPK: 1+
OD_SPK: 1+

## 2023-05-02 ASSESSMENT — REFRACTION_AUTOREFRACTION
OS_AXIS: 178
OS_SPHERE: -3.25
OS_CYLINDER: -2.00
OD_AXIS: 179
OD_SPHERE: -1.50
OD_CYLINDER: -1.75

## 2023-05-02 ASSESSMENT — SPHEQUIV_DERIVED
OS_SPHEQUIV: -4
OD_SPHEQUIV: -1.75
OD_SPHEQUIV: -2.375
OS_SPHEQUIV: -4.25

## 2023-05-02 ASSESSMENT — CONFRONTATIONAL VISUAL FIELD TEST (CVF)
OD_FINDINGS: FULL
OS_FINDINGS: FULL

## 2023-05-02 ASSESSMENT — LID POSITION - PTOSIS: OS_PTOSIS: LUL

## 2023-05-25 ENCOUNTER — OPTICAL OFFICE (OUTPATIENT)
Dept: URBAN - NONMETROPOLITAN AREA CLINIC 4 | Facility: CLINIC | Age: 38
Setting detail: OPHTHALMOLOGY
End: 2023-05-25
Payer: COMMERCIAL

## 2023-05-25 ENCOUNTER — DOCTOR'S OFFICE (OUTPATIENT)
Dept: URBAN - NONMETROPOLITAN AREA CLINIC 1 | Facility: CLINIC | Age: 38
Setting detail: OPHTHALMOLOGY
End: 2023-05-25
Payer: COMMERCIAL

## 2023-05-25 ENCOUNTER — RX ONLY (RX ONLY)
Age: 38
End: 2023-05-25

## 2023-05-25 DIAGNOSIS — H01.004: ICD-10-CM

## 2023-05-25 DIAGNOSIS — H01.005: ICD-10-CM

## 2023-05-25 DIAGNOSIS — H52.13: ICD-10-CM

## 2023-05-25 DIAGNOSIS — H04.123: ICD-10-CM

## 2023-05-25 DIAGNOSIS — H01.001: ICD-10-CM

## 2023-05-25 DIAGNOSIS — H01.002: ICD-10-CM

## 2023-05-25 PROBLEM — H52.223 ASTIGMATISM, REGULAR; BOTH EYES: Status: ACTIVE | Noted: 2023-05-02

## 2023-05-25 PROCEDURE — V2103 SPHEROCYLINDR 4.00D/12-2.00D: HCPCS

## 2023-05-25 PROCEDURE — 92012 INTRM OPH EXAM EST PATIENT: CPT

## 2023-05-25 PROCEDURE — V2020 VISION SVCS FRAMES PURCHASES: HCPCS

## 2023-05-25 ASSESSMENT — SPHEQUIV_DERIVED
OS_SPHEQUIV: -2.875
OD_SPHEQUIV: -2.125
OS_SPHEQUIV: -3.75
OD_SPHEQUIV: -1.375

## 2023-05-25 ASSESSMENT — REFRACTION_MANIFEST
OD_VA1: 20/25
OD_SPHERE: -1.50
OD_AXIS: 005
OD_CYLINDER: -1.25
OD_VA2: 20/25
OS_VA1: 20/25
OS_VA2: 20/25
OS_CYLINDER: -1.50
OS_SPHERE: -3.00
OU_VA: 20/25
OS_AXIS: 005

## 2023-05-25 ASSESSMENT — REFRACTION_CURRENTRX
OS_VPRISM_DIRECTION: SV
OS_SPHERE: -2.75
OD_SPHERE: -1.00
OD_OVR_VA: 20/
OD_CYLINDER: -0.75
OS_CYLINDER: -1.00
OD_AXIS: 011
OD_VPRISM_DIRECTION: SV
OS_OVR_VA: 20/
OS_AXIS: 137

## 2023-05-25 ASSESSMENT — CONFRONTATIONAL VISUAL FIELD TEST (CVF)
OD_FINDINGS: FULL
OS_FINDINGS: FULL

## 2023-05-25 ASSESSMENT — SUPERFICIAL PUNCTATE KERATITIS (SPK)
OD_SPK: ABSENT
OS_SPK: ABSENT

## 2023-05-25 ASSESSMENT — VISUAL ACUITY
OD_BCVA: 20/50+2
OS_BCVA: 20/25+1

## 2023-05-25 ASSESSMENT — REFRACTION_AUTOREFRACTION
OD_CYLINDER: -2.25
OS_SPHERE: -2.25
OD_AXIS: 176
OD_SPHERE: -0.25
OS_CYLINDER: -1.25
OS_AXIS: 175

## 2023-05-25 ASSESSMENT — LID POSITION - PTOSIS: OS_PTOSIS: LUL

## 2023-05-25 ASSESSMENT — LID EXAM ASSESSMENTS
OD_MEIBOMITIS: RLL RUL 4+
OD_BLEPHARITIS: 2+
OS_MEIBOMITIS: LLL LUL 4+
OS_BLEPHARITIS: 2+

## 2023-10-24 ENCOUNTER — OFFICE VISIT (OUTPATIENT)
Dept: URGENT CARE | Facility: CLINIC | Age: 38
End: 2023-10-24
Payer: COMMERCIAL

## 2023-10-24 VITALS
HEART RATE: 102 BPM | WEIGHT: 193 LBS | DIASTOLIC BLOOD PRESSURE: 88 MMHG | SYSTOLIC BLOOD PRESSURE: 119 MMHG | HEIGHT: 64 IN | TEMPERATURE: 97.3 F | BODY MASS INDEX: 32.95 KG/M2 | RESPIRATION RATE: 15 BRPM | OXYGEN SATURATION: 99 %

## 2023-10-24 DIAGNOSIS — R11.0 NAUSEA: ICD-10-CM

## 2023-10-24 DIAGNOSIS — J06.9 ACUTE RESPIRATORY DISEASE: Primary | ICD-10-CM

## 2023-10-24 LAB
SARS-COV-2 AG UPPER RESP QL IA: NEGATIVE
VALID CONTROL: NORMAL

## 2023-10-24 PROCEDURE — 87811 SARS-COV-2 COVID19 W/OPTIC: CPT

## 2023-10-24 PROCEDURE — 99213 OFFICE O/P EST LOW 20 MIN: CPT

## 2023-10-24 RX ORDER — ONDANSETRON 4 MG/1
4 TABLET, FILM COATED ORAL EVERY 8 HOURS PRN
Qty: 20 TABLET | Refills: 0 | Status: SHIPPED | OUTPATIENT
Start: 2023-10-24

## 2023-10-24 NOTE — PROGRESS NOTES
[facility]        NAME: Marysol Dinh is a 43 y.o. female  : 1985    MRN: [id number]  DATE: 2023  TIME: 2:09 PM    Assessment and Plan   Acute respiratory disease [J06.9]  1. Acute respiratory disease        2. Nausea  ondansetron (ZOFRAN) 4 mg tablet    Poct Covid 19 Rapid Antigen Test        Rapid covid negative  Will treat nausea with Zofran. Symptoms are consistent with viral illness recommend supportive care. Patient Instructions   Rapid covid negative  Symptoms are consistent with viral illness. Recommend supportive care. Push fluids. 1gram vitamin C twice daily. Multivitamin. Take zofran as needed for nausea. Pt appears to have a viral upper respiratory infection. Antibiotics are contraindicated at this time and would not help you feel better faster. Although the symptoms are troublesome, usually the patient's body is able to recover from a viral infection on an average time of 7-10 days. Fever, if any, typically resolves after 3-5 days. If patient has sore throat, typically this resolves within 3-5 days. Any nasal congestion, runny nose, post nasal drip typically begin to  improve after 7-10 days. Any cough may linger over a couple weeks. Please note that having a cough is not necessarily a bad thing. It often times is part of our body's protective mechanism to help keep our airways clear. Please note that yellow mucous doesn't necessarily mean a "bacterial" infection. Yellow mucous doesn't automatically mean that an antibiotic is needed. It is not unusual for mucus to become more discolored in the days after the start of an upper respiratory infection. Often times this is due to mucous that has thickened  with white blood cells that have flooded the mucosa to try and fight the viral infection. Ear Pain may occur when the eustachian tubes become blocked with mucous or swollen due to acute inflammation from illness.  May give Ibuprofen or Tylenol as needed for comfort. May also use warm compress against ear for comfort. If ear ache is persisting and not improving over 2-3 days or if there is any gross drainage coming from ear, please seek further evaluation. Natural remedies to help provide comfort for cough/ cold symptoms include: one teaspoon of honey (only in infants over 1 year of age), increased vitamin C (oranges, chelsey, etc.), ginger, and drinking plenty of fluids. Vaporizer by bedside. If you should have prolonged symptoms (Greater than 10 days), worsening symptoms, or any new symptoms please seek further medical attention. If you would be having difficulty breathing, seek further evaluation by calling 911 or proceeding to ER for further evaluation. Follow up with PCP in 3-5 days. Proceed to  ER if symptoms worsen. Chief Complaint     Chief Complaint   Patient presents with    Cold Like Symptoms         History of Present Illness       Patient is a 43year old female who presents to the office today for lethargy, brain fog fever (100.), nausea, chills, myalgia, headache, rhinorrhea. Symptoms started yesterday. Denies sick contacts at home or at work. Has been taking ibuprofen. Review of Systems   Review of Systems   Constitutional:  Positive for chills, fatigue and fever. HENT:  Positive for congestion and rhinorrhea. Negative for ear pain. Respiratory:  Positive for cough. Gastrointestinal:  Positive for nausea. Musculoskeletal:  Positive for myalgias. Neurological:  Positive for headaches. All other systems reviewed and are negative.         Current Medications       Current Outpatient Medications:     atorvastatin (LIPITOR) 20 mg tablet, Take 20 mg by mouth daily, Disp: , Rfl:     buPROPion (WELLBUTRIN XL) 300 mg 24 hr tablet, Take 450 mg by mouth, Disp: , Rfl:     busPIRone (BUSPAR) 30 MG tablet, Take 30 mg by mouth 2 (two) times a day, Disp: , Rfl:     EPINEPHrine (EPIPEN) 0.3 mg/0.3 mL SOAJ, INJECT 0.3 MLS (0.3 MG TOTAL) INTO THE MUSCLE ONE TIME FOR 1 DOSE, Disp: , Rfl:     Farxiga 10 MG TABS, Take 10 mg by mouth daily, Disp: , Rfl:     FLUoxetine (PROzac) 20 mg capsule, , Disp: , Rfl:     hydrOXYzine HCL (ATARAX) 50 mg tablet, Take 50 mg by mouth Three times daily as needed, Disp: , Rfl:     lamoTRIgine (LaMICtal) 100 mg tablet, Take 100 mg by mouth, Disp: , Rfl:     levothyroxine 50 mcg tablet, Take 50 mcg by mouth daily, Disp: , Rfl:     metFORMIN (GLUCOPHAGE-XR) 500 mg 24 hr tablet, Take 2,000 mg by mouth daily, Disp: , Rfl:     ondansetron (ZOFRAN) 4 mg tablet, Take 1 tablet (4 mg total) by mouth every 8 (eight) hours as needed for nausea or vomiting, Disp: 20 tablet, Rfl: 0    Premarin 1.25 MG tablet, Take 1.25 mg by mouth daily, Disp: , Rfl:     Current Allergies     Allergies as of 10/24/2023 - Reviewed 10/24/2023   Allergen Reaction Noted    Lisinopril Angioedema 2022    Sulfacetamide Rash 2022    Ibuprofen Nausea Only 2022    Sulfa antibiotics Rash 10/24/2023            The following portions of the patient's history were reviewed and updated as appropriate: allergies, current medications, past family history, past medical history, past social history, past surgical history and problem list.     Past Medical History:   Diagnosis Date    Anxiety     Bipolar 1 disorder ([facility])     Depression     Diabetes mellitus ([facility])     Hypertension     Ovarian cancer ([facility])        Past Surgical History:   Procedure Laterality Date    BREAST RECONSTRUCTION  2022     SECTION      CHOLECYSTECTOMY      HYSTERECTOMY         Family History   Problem Relation Age of Onset    Cancer Mother     Mental illness Mother     Hyperlipidemia Father          Medications have been verified.         Objective   /88   Pulse 102   Temp (!) 97.3 °F (36.3 °C)   Resp 15   Ht 5' 4" (1.626 m)   Wt 87.5 kg (193 lb)   SpO2 99%   BMI 33.13 kg/m²        Physical Exam     Physical Exam  Vitals and nursing note reviewed. Constitutional:       General: She is not in acute distress. Appearance: She is normal weight. She is ill-appearing. She is not toxic-appearing or diaphoretic. HENT:      Right Ear: Tympanic membrane normal.      Left Ear: Tympanic membrane normal.      Nose: Nose normal.      Mouth/Throat:      Mouth: Mucous membranes are moist.      Pharynx: Posterior oropharyngeal erythema (posterior pharynx) present. Cardiovascular:      Rate and Rhythm: Regular rhythm. Tachycardia present. Pulses: Normal pulses. Heart sounds: Normal heart sounds. Pulmonary:      Effort: Pulmonary effort is normal.      Breath sounds: Normal breath sounds. Skin:     General: Skin is warm. Capillary Refill: Capillary refill takes less than 2 seconds. Neurological:      Mental Status: She is alert.

## 2023-10-24 NOTE — PATIENT INSTRUCTIONS
Rapid covid negative  Symptoms are consistent with viral illness. Recommend supportive care. Push fluids. 1gram vitamin C twice daily. Multivitamin. Take zofran as needed for nausea. Pt appears to have a viral upper respiratory infection. Antibiotics are contraindicated at this time and would not help you feel better faster. Although the symptoms are troublesome, usually the patient's body is able to recover from a viral infection on an average time of 7-10 days. Fever, if any, typically resolves after 3-5 days. If patient has sore throat, typically this resolves within 3-5 days. Any nasal congestion, runny nose, post nasal drip typically begin to  improve after 7-10 days. Any cough may linger over a couple weeks. Please note that having a cough is not necessarily a bad thing. It often times is part of our body's protective mechanism to help keep our airways clear. Please note that yellow mucous doesn't necessarily mean a "bacterial" infection. Yellow mucous doesn't automatically mean that an antibiotic is needed. It is not unusual for mucus to become more discolored in the days after the start of an upper respiratory infection. Often times this is due to mucous that has thickened  with white blood cells that have flooded the mucosa to try and fight the viral infection. Ear Pain may occur when the eustachian tubes become blocked with mucous or swollen due to acute inflammation from illness. May give Ibuprofen or Tylenol as needed for comfort. May also use warm compress against ear for comfort. If ear ache is persisting and not improving over 2-3 days or if there is any gross drainage coming from ear, please seek further evaluation. Natural remedies to help provide comfort for cough/ cold symptoms include: one teaspoon of honey (only in infants over 1 year of age), increased vitamin C (oranges, chelsey, etc.), ginger, and drinking plenty of fluids. Vaporizer by bedside.     If you should have prolonged symptoms (Greater than 10 days), worsening symptoms, or any new symptoms please seek further medical attention. If you would be having difficulty breathing, seek further evaluation by calling 911 or proceeding to ER for further evaluation.

## 2023-12-05 ENCOUNTER — OFFICE VISIT (OUTPATIENT)
Dept: URGENT CARE | Facility: CLINIC | Age: 38
End: 2023-12-05
Payer: COMMERCIAL

## 2023-12-05 ENCOUNTER — APPOINTMENT (OUTPATIENT)
Dept: RADIOLOGY | Facility: CLINIC | Age: 38
End: 2023-12-05
Payer: COMMERCIAL

## 2023-12-05 VITALS
RESPIRATION RATE: 18 BRPM | TEMPERATURE: 98.9 F | HEART RATE: 96 BPM | WEIGHT: 195 LBS | DIASTOLIC BLOOD PRESSURE: 95 MMHG | HEIGHT: 64 IN | BODY MASS INDEX: 33.29 KG/M2 | SYSTOLIC BLOOD PRESSURE: 156 MMHG | OXYGEN SATURATION: 96 %

## 2023-12-05 DIAGNOSIS — J98.01 BRONCHOSPASM, ACUTE: Primary | ICD-10-CM

## 2023-12-05 DIAGNOSIS — R05.1 ACUTE COUGH: ICD-10-CM

## 2023-12-05 PROCEDURE — 71046 X-RAY EXAM CHEST 2 VIEWS: CPT

## 2023-12-05 PROCEDURE — 99213 OFFICE O/P EST LOW 20 MIN: CPT

## 2023-12-05 RX ORDER — PREDNISONE 20 MG/1
40 TABLET ORAL DAILY
Qty: 10 TABLET | Refills: 0 | Status: SHIPPED | OUTPATIENT
Start: 2023-12-05 | End: 2023-12-10

## 2023-12-05 RX ORDER — ALBUTEROL SULFATE 90 UG/1
2 AEROSOL, METERED RESPIRATORY (INHALATION) EVERY 6 HOURS PRN
Qty: 8.5 G | Refills: 0 | Status: SHIPPED | OUTPATIENT
Start: 2023-12-05

## 2023-12-05 NOTE — PATIENT INSTRUCTIONS
No pneumonia noted on xray  Official read will be completed. Will call if alternative findings discovered. Take steroid as directed and use inhaler as needed. Avoid triggers.

## 2023-12-05 NOTE — PROGRESS NOTES
[facility]        NAME: Bismark Monk is a 43 y.o. female  : 1985    MRN: [id number]  DATE: 2023  TIME: 1:23 PM    Assessment and Plan   Bronchospasm, acute [J98.01]  1. Bronchospasm, acute  XR chest pa & lateral    albuterol (ProAir HFA) 90 mcg/act inhaler    predniSONE 20 mg tablet        Cxr negative for acute disease. Will treat as asthma exacerbation with bronchospasm. Prednisone and albuterol. Avoid triggers. Patient Instructions     No pneumonia noted on xray  Official read will be completed. Will call if alternative findings discovered. Take steroid as directed and use inhaler as needed. Avoid triggers. Follow up with PCP in 3-5 days. Proceed to  ER if symptoms worsen. Chief Complaint     Chief Complaint   Patient presents with    Cough    Shortness of Breath         History of Present Illness       Patient is a 43year old female who presents to the office today for a cough and rib pain. States that it started this morning. Denies sick contacts. Has hx of asthma as a child. Does not take any asthma medication. Notes shortness of breath. Was cleaning [place] when symptoms started. Cough  Associated symptoms include shortness of breath. Pertinent negatives include no chest pain, chills, fever or rhinorrhea. Shortness of Breath  Associated symptoms include coughing. Pertinent negatives include no chest pain, palpitations or rhinorrhea. Review of Systems   Review of Systems   Constitutional:  Negative for chills and fever. HENT:  Negative for congestion and rhinorrhea. Respiratory:  Positive for cough and shortness of breath. Cardiovascular:  Negative for chest pain and palpitations. All other systems reviewed and are negative.         Current Medications       Current Outpatient Medications:     albuterol (ProAir HFA) 90 mcg/act inhaler, Inhale 2 puffs every 6 (six) hours as needed for wheezing or shortness of breath, Disp: 8.5 g, Rfl: 0 atorvastatin (LIPITOR) 20 mg tablet, Take 20 mg by mouth daily, Disp: , Rfl:     buPROPion (WELLBUTRIN XL) 300 mg 24 hr tablet, Take 450 mg by mouth, Disp: , Rfl:     busPIRone (BUSPAR) 30 MG tablet, Take 30 mg by mouth 2 (two) times a day, Disp: , Rfl:     EPINEPHrine (EPIPEN) 0.3 mg/0.3 mL SOAJ, INJECT 0.3 MLS (0.3 MG TOTAL) INTO THE MUSCLE ONE TIME FOR 1 DOSE, Disp: , Rfl:     Farxiga 10 MG TABS, Take 10 mg by mouth daily, Disp: , Rfl:     FLUoxetine (PROzac) 20 mg capsule, , Disp: , Rfl:     hydrOXYzine HCL (ATARAX) 50 mg tablet, Take 50 mg by mouth Three times daily as needed, Disp: , Rfl:     lamoTRIgine (LaMICtal) 100 mg tablet, Take 100 mg by mouth, Disp: , Rfl:     levothyroxine 50 mcg tablet, Take 50 mcg by mouth daily, Disp: , Rfl:     metFORMIN (GLUCOPHAGE-XR) 500 mg 24 hr tablet, Take 2,000 mg by mouth daily, Disp: , Rfl:     ondansetron (ZOFRAN) 4 mg tablet, Take 1 tablet (4 mg total) by mouth every 8 (eight) hours as needed for nausea or vomiting, Disp: 20 tablet, Rfl: 0    predniSONE 20 mg tablet, Take 2 tablets (40 mg total) by mouth daily for 5 days, Disp: 10 tablet, Rfl: 0    Premarin 1.25 MG tablet, Take 1.25 mg by mouth daily, Disp: , Rfl:     Current Allergies     Allergies as of 2023 - Reviewed 2023   Allergen Reaction Noted    Lisinopril Angioedema 2022    Sulfacetamide Rash 2022    Ibuprofen Nausea Only 2022    Sulfa antibiotics Rash 10/24/2023            The following portions of the patient's history were reviewed and updated as appropriate: allergies, current medications, past family history, past medical history, past social history, past surgical history and problem list.     Past Medical History:   Diagnosis Date    Anxiety     Bipolar 1 disorder ([facility])     Depression     Diabetes mellitus ([facility])     Hypertension     Ovarian cancer ([facility])        Past Surgical History:   Procedure Laterality Date    BREAST RECONSTRUCTION  2022     SECTION CHOLECYSTECTOMY      HYSTERECTOMY         Family History   Problem Relation Age of Onset    Cancer Mother     Mental illness Mother     Hyperlipidemia Father          Medications have been verified. Objective   /95   Pulse 96   Temp 98.9 °F (37.2 °C)   Resp 18   Ht 5' 4" (1.626 m)   Wt 88.5 kg (195 lb)   SpO2 96%   BMI 33.47 kg/m²        Physical Exam     Physical Exam  Vitals and nursing note reviewed. Constitutional:       General: She is not in acute distress. Appearance: She is well-developed and normal weight. She is not ill-appearing or toxic-appearing. HENT:      Right Ear: Tympanic membrane normal.      Left Ear: Tympanic membrane normal.      Nose: Nose normal.      Mouth/Throat:      Mouth: Mucous membranes are moist.   Cardiovascular:      Rate and Rhythm: Normal rate and regular rhythm. Pulmonary:      Effort: Pulmonary effort is normal.      Breath sounds: Normal breath sounds. Skin:     General: Skin is warm. Capillary Refill: Capillary refill takes less than 2 seconds. Neurological:      Mental Status: She is alert.